# Patient Record
Sex: FEMALE | Race: WHITE | Employment: FULL TIME | URBAN - METROPOLITAN AREA
[De-identification: names, ages, dates, MRNs, and addresses within clinical notes are randomized per-mention and may not be internally consistent; named-entity substitution may affect disease eponyms.]

---

## 2017-05-13 ENCOUNTER — HOSPITAL ENCOUNTER (OUTPATIENT)
Dept: WOMENS IMAGING | Age: 57
Discharge: HOME | End: 2017-05-13

## 2017-05-13 DIAGNOSIS — Z12.31 ENCOUNTER FOR SCREENING MAMMOGRAM FOR HIGH-RISK PATIENT: ICD-10-CM

## 2018-05-21 ENCOUNTER — HOSPITAL ENCOUNTER (OUTPATIENT)
Dept: WOMENS IMAGING | Age: 58
Discharge: HOME | End: 2018-05-21

## 2018-05-21 DIAGNOSIS — Z12.31 VISIT FOR SCREENING MAMMOGRAM: ICD-10-CM

## 2019-06-03 ENCOUNTER — HOSPITAL ENCOUNTER (OUTPATIENT)
Dept: WOMENS IMAGING | Age: 59
Discharge: HOME | End: 2019-06-03
Payer: COMMERCIAL

## 2019-06-03 DIAGNOSIS — Z12.31 VISIT FOR SCREENING MAMMOGRAM: ICD-10-CM

## 2019-06-03 PROCEDURE — 77067 SCR MAMMO BI INCL CAD: CPT

## 2021-10-14 RX ORDER — SUMATRIPTAN 50 MG/1
50 TABLET, FILM COATED ORAL
COMMUNITY
Start: 2021-03-23

## 2021-10-14 RX ORDER — ATORVASTATIN CALCIUM 20 MG/1
20 TABLET, FILM COATED ORAL DAILY
COMMUNITY
Start: 2021-09-21

## 2021-10-14 RX ORDER — ESCITALOPRAM OXALATE 5 MG/1
5 TABLET ORAL DAILY
COMMUNITY
Start: 2021-09-21

## 2021-10-14 RX ORDER — LISINOPRIL 30 MG/1
30 TABLET ORAL DAILY
COMMUNITY
Start: 2021-03-23 | End: 2022-03-20

## 2021-10-19 ENCOUNTER — ANESTHESIA EVENT (OUTPATIENT)
Dept: ENDOSCOPY | Age: 61
End: 2021-10-19
Payer: COMMERCIAL

## 2021-10-20 ENCOUNTER — ANESTHESIA (OUTPATIENT)
Dept: ENDOSCOPY | Age: 61
End: 2021-10-20
Payer: COMMERCIAL

## 2021-10-20 ENCOUNTER — HOSPITAL ENCOUNTER (OUTPATIENT)
Age: 61
Setting detail: OUTPATIENT SURGERY
Discharge: HOME | End: 2021-10-20
Attending: INTERNAL MEDICINE | Admitting: INTERNAL MEDICINE
Payer: COMMERCIAL

## 2021-10-20 VITALS
HEART RATE: 83 BPM | OXYGEN SATURATION: 100 % | WEIGHT: 175.2 LBS | TEMPERATURE: 98.7 F | SYSTOLIC BLOOD PRESSURE: 127 MMHG | HEIGHT: 64 IN | BODY MASS INDEX: 29.91 KG/M2 | RESPIRATION RATE: 16 BRPM | DIASTOLIC BLOOD PRESSURE: 76 MMHG

## 2021-10-20 VITALS — SYSTOLIC BLOOD PRESSURE: 133 MMHG | DIASTOLIC BLOOD PRESSURE: 85 MMHG | OXYGEN SATURATION: 98 %

## 2021-10-20 PROCEDURE — 7100000011 HC PHASE II RECOVERY - ADDTL 15 MIN: Performed by: INTERNAL MEDICINE

## 2021-10-20 PROCEDURE — 2500000003 HC RX 250 WO HCPCS: Performed by: NURSE ANESTHETIST, CERTIFIED REGISTERED

## 2021-10-20 PROCEDURE — 3700000000 HC ANESTHESIA ATTENDED CARE: Performed by: INTERNAL MEDICINE

## 2021-10-20 PROCEDURE — 6360000002 HC RX W HCPCS: Performed by: NURSE ANESTHETIST, CERTIFIED REGISTERED

## 2021-10-20 PROCEDURE — 7100000010 HC PHASE II RECOVERY - FIRST 15 MIN: Performed by: INTERNAL MEDICINE

## 2021-10-20 PROCEDURE — 3609027000 HC COLONOSCOPY: Performed by: INTERNAL MEDICINE

## 2021-10-20 PROCEDURE — 3700000001 HC ADD 15 MINUTES (ANESTHESIA): Performed by: INTERNAL MEDICINE

## 2021-10-20 PROCEDURE — 2580000003 HC RX 258: Performed by: ANESTHESIOLOGY

## 2021-10-20 RX ORDER — SODIUM CHLORIDE 0.9 % (FLUSH) 0.9 %
5-40 SYRINGE (ML) INJECTION EVERY 12 HOURS SCHEDULED
Status: DISCONTINUED | OUTPATIENT
Start: 2021-10-20 | End: 2021-10-20 | Stop reason: HOSPADM

## 2021-10-20 RX ORDER — SODIUM CHLORIDE 9 MG/ML
25 INJECTION, SOLUTION INTRAVENOUS PRN
Status: DISCONTINUED | OUTPATIENT
Start: 2021-10-20 | End: 2021-10-20 | Stop reason: HOSPADM

## 2021-10-20 RX ORDER — LIDOCAINE HYDROCHLORIDE 20 MG/ML
INJECTION, SOLUTION EPIDURAL; INFILTRATION; INTRACAUDAL; PERINEURAL PRN
Status: DISCONTINUED | OUTPATIENT
Start: 2021-10-20 | End: 2021-10-20 | Stop reason: SDUPTHER

## 2021-10-20 RX ORDER — SODIUM CHLORIDE 0.9 % (FLUSH) 0.9 %
5-40 SYRINGE (ML) INJECTION PRN
Status: DISCONTINUED | OUTPATIENT
Start: 2021-10-20 | End: 2021-10-20 | Stop reason: HOSPADM

## 2021-10-20 RX ORDER — PROPOFOL 10 MG/ML
INJECTION, EMULSION INTRAVENOUS PRN
Status: DISCONTINUED | OUTPATIENT
Start: 2021-10-20 | End: 2021-10-20 | Stop reason: SDUPTHER

## 2021-10-20 RX ORDER — SODIUM CHLORIDE 9 MG/ML
INJECTION, SOLUTION INTRAVENOUS CONTINUOUS
Status: DISCONTINUED | OUTPATIENT
Start: 2021-10-20 | End: 2021-10-20 | Stop reason: HOSPADM

## 2021-10-20 RX ADMIN — LIDOCAINE HYDROCHLORIDE 100 MG: 20 INJECTION, SOLUTION EPIDURAL; INFILTRATION; INTRACAUDAL; PERINEURAL at 07:31

## 2021-10-20 RX ADMIN — PROPOFOL 100 MG: 10 INJECTION, EMULSION INTRAVENOUS at 07:31

## 2021-10-20 RX ADMIN — SODIUM CHLORIDE: 9 INJECTION, SOLUTION INTRAVENOUS at 07:18

## 2021-10-20 RX ADMIN — PROPOFOL 20 MG: 10 INJECTION, EMULSION INTRAVENOUS at 07:41

## 2021-10-20 RX ADMIN — PROPOFOL 30 MG: 10 INJECTION, EMULSION INTRAVENOUS at 07:37

## 2021-10-20 ASSESSMENT — LIFESTYLE VARIABLES: SMOKING_STATUS: 0

## 2021-10-20 ASSESSMENT — PAIN SCALES - GENERAL
PAINLEVEL_OUTOF10: 0
PAINLEVEL_OUTOF10: 0

## 2021-10-20 ASSESSMENT — PAIN - FUNCTIONAL ASSESSMENT: PAIN_FUNCTIONAL_ASSESSMENT: 0-10

## 2021-10-20 NOTE — OP NOTE
Colonoscopy Procedure Note      Patient: Romero Boyle  : 1960  Acct#:     Procedure: Colonoscopy     Date:  10/20/2021    Surgeon:  Franck Paredes DO    Referring Physician:  Yoon Garza MD    Previous Colonoscopy: YES  Date: unknown  Greater than 3 years: YES    Preoperative Diagnosis:  1)Screening colonoscopy- Average risk     Postoperative Diagnosis:  1) The visualized colonic mucosa was without visible abnormalities. Specifically, no colon polyps were identified. 2) Mild left sided diverticulosis was noted. 3) Moderate internal hemorrhoids were noted. Consent:  The patient or their legal guardian has signed a consent, and is aware of the potential risks, benefits, alternatives, and potential complications of this procedure. These include, but are not limited to hemorrhage, bleeding, post procedural pain, perforation, phlebitis, aspiration, hypotension, hypoxia, cardiovascular events such as arryhthmia, and possibly death. Additionally, the possibility of missed colonic polyps and interval colon cancer was discussed in the consent. Anesthesia:  The patient was administered TIVA per anesthesiology team.  Please see their operative records for full details of medications administered. Procedure: An informed consent was obtained from the patient after explanation of indications, benefits, possible risks and complications of the procedure. The patient was then taken to the endoscopy suite, placed in the left lateral decubitus position, and the above IV anesthesia was administered. A digital rectal examination was performed and revealed negative without mass, lesions or tenderness, internal hemorrhoids noted. The Olympus video colonoscope was placed in the patient's rectum under digital direction and advanced to the cecum. The cecum was identified by characteristic anatomy and ballottment. The ileocecal valve was identified. The preparation was good.     The terminal ileum was normal appearing. The scope was then withdrawn back through the cecum, ascending, transverse, descending, sigmoid colon, and rectum. Careful circumferential examination of the mucosa in these areas demonstrated:    1) The visualized colonic mucosa was without visible abnormalities. Specifically, no colon polyps were identified. 2) Mild left sided diverticulosis was noted. The scope was then withdrawn into the rectum and retroflexed. The retroflexed view of the anal verge and rectum demonstrates moderate internal hemorrhoids. The scope was straightened, the colon was decompressed and the scope was withdrawn from the patient. The patient tolerated the procedure well and was taken to the PACU in good condition. Estimated blood loss: None    * No specimens in log *      Impression:  See post-procedure diagnoses. Recommendations:  Repeat colonoscopy in 10 years.     Kimmie Merino, DO  [address] [place] [address]  10/20/2021  [phone]

## 2021-10-20 NOTE — ANESTHESIA POSTPROCEDURE EVALUATION
Department of Anesthesiology  Postprocedure Note    Patient: Ashlee Garcia  MRN: [id number]  [place]: 1960  Date of evaluation: 10/20/2021  Time:  8:08 AM     Procedure Summary     Date: 10/20/21 Room / Location: [address] [place] [address]    Anesthesia Start: 0726 Anesthesia Stop: 4856    Procedure: COLORECTAL CANCER SCREENING, NOT HIGH RISK (N/A ) Diagnosis:       Screen for colon cancer      (Screen for colon cancer)    Surgeons: Kimmie Cash DO Responsible Provider: Talmadge Galeazzi, MD    Anesthesia Type: MAC ASA Status: 2          Anesthesia Type: MAC    Stu Phase I: Stu Score: 10    Stu Phase II: Stu Score: 10    Last vitals: Reviewed and per EMR flowsheets.        Anesthesia Post Evaluation    Patient location during evaluation: PACU  Patient participation: complete - patient participated  Level of consciousness: awake and alert  Pain score: 0  Airway patency: patent  Nausea & Vomiting: no nausea and no vomiting  Complications: no  Cardiovascular status: blood pressure returned to baseline  Respiratory status: acceptable  Hydration status: euvolemic

## 2021-10-20 NOTE — H&P
Pre-operative History and Physical    Patient: Leal Cramp  : 1960  Acct#:     Intended Procedure: Colonoscopy    HISTORY OF PRESENT ILLNESS:  The patient is a 64 y.o. female  who presents for/due to Screening colonoscopy- Average risk       Past Medical History:        Diagnosis Date    Arthritis 2020    rt. acromioclavicular joint    Cervical disc disorder 2021    DJD (degenerative joint disease) of cervical spine 2021    FERNANDA (generalized anxiety disorder) 2019    H/O tubal ligation 2007    Hyperlipidemia 2017    Hypertension 2009    Migraines      Past Surgical History:        Procedure Laterality Date    CERVICAL DISCECTOMY       SECTION       SECTION  1984    COLONOSCOPY      DILATION AND CURETTAGE OF UTERUS  2010    ENDOMETRIAL ABLATION      HYSTERECTOMY      TUBAL LIGATION      WISDOM TOOTH EXTRACTION       Medications Prior to Admission:   No current facility-administered medications on file prior to encounter. Current Outpatient Medications on File Prior to Encounter   Medication Sig Dispense Refill    Calcium Carbonate-Vitamin D 500-400 MG-UNIT TABS Take by mouth daily      atorvastatin (LIPITOR) 20 MG tablet Take 20 mg by mouth daily      escitalopram (LEXAPRO) 5 MG tablet Take 5 mg by mouth daily      lisinopril (PRINIVIL;ZESTRIL) 30 MG tablet Take 30 mg by mouth daily      SUMAtriptan (IMITREX) 50 MG tablet Take 50 mg by mouth every 2 hours as needed          Allergies:  Hydrocodone-acetaminophen, Prednisone, and Sulfa antibiotics    Social History:   TOBACCO:   reports that she has never smoked. She has never used smokeless tobacco.  ETOH:   reports current alcohol use. DRUGS:   has no history on file for drug use.     PHYSICAL EXAM:      Vital Signs: /85   Pulse 97   Temp 98.7 °F (37.1 °C) (Temporal)   Resp 18   Ht 5' 4\" (1.626 m)   Wt 175 lb 3.2 oz (79.5 kg)   SpO2 98%   BMI 30.07 kg/m²    Airway: No stridor or wheezing noted. Good air movement  Pulmonary: without wheezes. Clear to auscultation  Cardiac:regular rate and rhythm without loud murmurs  Abdomen:soft, nontender,  Bowel sounds present    Pre-Procedure Assessment / Plan:  1) Screening colonoscopy- Average risk     ASA Grade:  ASA 2 - Patient with mild systemic disease with no functional limitations  Mallampati Classification:  Class II    Level of Sedation Plan:Deep sedation    Post Procedure plan: Return to same level of care    I assessed the patient and find that the patient is in satisfactory condition to proceed with the planned procedure and sedation plan. I have explained the risk, benefits, and alternatives to the procedure; the patient understands and agrees to proceed. The patient was counseled at length about the risks of mira Covid-19 during their perioperative period and any recovery window from their procedure. The patient was made aware that mira Covid-19  may worsen their prognosis for recovering from their procedure  and lend to a higher morbidity and/or mortality risk. All material risks, benefits, and reasonable alternatives including postponing the procedure were discussed. The patient does wish to proceed with the procedure at this time.       Surya Fernandes,   10/20/2021

## 2021-10-20 NOTE — ANESTHESIA PRE PROCEDURE
[facility] Department of Anesthesiology  Pre-Anesthesia Evaluation/Consultation       Name:  Huong Rajan  : 1960  Age:  64 y.o. MRN:  [id number]  Date: 10/20/2021           Surgeon: Surgeon(s):  Therese Melendrez DO    Procedure: Procedure(s):  COLORECTAL CANCER SCREENING, NOT HIGH RISK     Allergies   Allergen Reactions    Hydrocodone-Acetaminophen Other (See Comments)    Prednisone Anxiety and Other (See Comments)     HAD PSYCHOSIS- CAN NOT TOLERATE  Patient noted psychosis episodes      Sulfa Antibiotics Nausea And Vomiting     There is no problem list on file for this patient. Past Medical History:   Diagnosis Date    Arthritis 2020    rt. acromioclavicular joint    Cervical disc disorder 2021    DJD (degenerative joint disease) of cervical spine 2021    FERNANDA (generalized anxiety disorder) 2019    H/O tubal ligation 2007    Hyperlipidemia 2017    Hypertension 2009    Migraines      Past Surgical History:   Procedure Laterality Date    CERVICAL DISCECTOMY  2013     SECTION       SECTION  1984    COLONOSCOPY      DILATION AND CURETTAGE OF UTERUS  2010    ENDOMETRIAL ABLATION      HYSTERECTOMY      TUBAL LIGATION      WISDOM TOOTH EXTRACTION       Social History     Tobacco Use    Smoking status: Never Smoker    Smokeless tobacco: Never Used   Vaping Use    Vaping Use: Never used   Substance Use Topics    Alcohol use: Yes    Drug use: Not on file     Medications  No current facility-administered medications on file prior to encounter.      Current Outpatient Medications on File Prior to Encounter   Medication Sig Dispense Refill    Calcium Carbonate-Vitamin D 500-400 MG-UNIT TABS Take by mouth daily      atorvastatin (LIPITOR) 20 MG tablet Take 20 mg by mouth daily      escitalopram (LEXAPRO) 5 MG tablet Take 5 mg by mouth daily      lisinopril (PRINIVIL;ZESTRIL) 30 MG tablet Take 30 mg by mouth daily      SUMAtriptan (IMITREX) 50 MG tablet Take 50 mg by mouth every 2 hours as needed       No current facility-administered medications for this encounter. Vital Signs (Current)   Vitals:    10/15/21 1551   Weight: 168 lb (76.2 kg)   Height: 5' 4\" (1.626 m)                                            Vital Signs Statistics (for past 48 hrs)     No data recorded  BP Readings from Last 3 Encounters:   No data found for BP       BMI  Body mass index is 28.84 kg/m². Estimated body mass index is 28.84 kg/m² as calculated from the following:    Height as of this encounter: 5' 4\" (1.626 m). Weight as of this encounter: 168 lb (76.2 kg). CBC No results found for: WBC, RBC, HGB, HCT, MCV, RDW, PLT  CMP  No results found for: NA, K, CL, CO2, BUN, CREATININE, GFRAA, AGRATIO, LABGLOM, GLUCOSE, PROT, CALCIUM, BILITOT, ALKPHOS, AST, ALT  BMP  No results found for: NA, K, CL, CO2, BUN, CREATININE, CALCIUM, GFRAA, LABGLOM, GLUCOSE  POCGlucose  No results for input(s): GLUCOSE in the last 72 hours.    Coags  No results found for: PROTIME, INR, APTT  HCG (If Applicable) No results found for: PREGTESTUR, PREGSERUM, HCG, HCGQUANT   ABGs No results found for: PHART, PO2ART, KGN4EFR, YQS9NMH, BEART, K3JTBVHB   Type & Screen (If Applicable)  No results found for: LABABO, LABRH                         BMI: Wt Readings from Last 3 Encounters:       NPO Status:  >8h                          Anesthesia Evaluation  Patient summary reviewed no history of anesthetic complications:   Airway: Mallampati: II  TM distance: >3 FB   Neck ROM: full  Mouth opening: > = 3 FB Dental: normal exam         Pulmonary: breath sounds clear to auscultation      (-) COPD, asthma, sleep apnea and not a current smoker                           Cardiovascular:  Exercise tolerance: good (>4 METS),   (+) hypertension:, hyperlipidemia    (-) past MI, CABG/stent and  angina        Rate: normal                    Neuro/Psych:   (+) headaches: migraine headaches, depression/anxiety             GI/Hepatic/Renal:   (+) bowel prep,      (-) GERD, liver disease, no renal disease and no morbid obesity       Endo/Other:        (-) diabetes mellitus, hypothyroidism               Abdominal:             Vascular: Other Findings:           Anesthesia Plan      MAC     ASA 2       Induction: intravenous. Anesthetic plan and risks discussed with patient. Plan discussed with CRNA. This pre-anesthesia assessment may be used as a history and physical.    DOS STAFF ADDENDUM:    Pt seen and examined, chart reviewed (including anesthesia, drug and allergy history). No interval changes to history and physical examination. Anesthetic plan, risks, benefits, alternatives, and personnel involved discussed with patient. Patient verbalized an understanding and agrees to proceed.       Shalini Hall MD  October 20, 2021  7:06 AM